# Patient Record
Sex: MALE | Race: WHITE | Employment: OTHER | URBAN - METROPOLITAN AREA
[De-identification: names, ages, dates, MRNs, and addresses within clinical notes are randomized per-mention and may not be internally consistent; named-entity substitution may affect disease eponyms.]

---

## 2023-02-15 ENCOUNTER — OFFICE VISIT (OUTPATIENT)
Dept: ORTHOPEDIC SURGERY | Age: 83
End: 2023-02-15

## 2023-02-15 VITALS — BODY MASS INDEX: 29.47 KG/M2 | WEIGHT: 199 LBS | HEIGHT: 69 IN

## 2023-02-15 DIAGNOSIS — M25.561 ACUTE PAIN OF BOTH KNEES: Primary | ICD-10-CM

## 2023-02-15 DIAGNOSIS — M17.11 PRIMARY OSTEOARTHRITIS OF RIGHT KNEE: ICD-10-CM

## 2023-02-15 DIAGNOSIS — M17.12 PRIMARY OSTEOARTHRITIS OF LEFT KNEE: ICD-10-CM

## 2023-02-15 DIAGNOSIS — M25.562 ACUTE PAIN OF BOTH KNEES: Primary | ICD-10-CM

## 2023-02-15 NOTE — PROGRESS NOTES
Name: Donna Alexander  YOB: 1940  Gender: male  MRN: [id number]    CC: Bilateral knee pain right worse than left    HPI: Donna Alexander is a 79 y.o. male who presents bilateral knee pain right worse than left. He does have a history with his right knee having had arthroscopic surgery x2 with an initial procedure for debridement and removal of loose bodies but a second procedure to remove a third loose body. This was done in [place] and the patient unfortunately not see much benefit following these procedures. Since then over the past couple of years he seen progressive stiffness in his right knee and difficulty in getting back to his desired activities of playing golf and ambulating on uneven ground. His left knee has been giving him more difficulty recently and he wonders if it is related to his right knee limitations. He did see Dr. Irasema Garcia recently and was evaluated and found to have significant degenerative joint disease of his right knee worse in the left and his continued and longstanding symptomatology radiographic and clinical findings it would suggest that he seek an opinion for joint replacement surgery. History was obtained from patient    ROS/Meds/PSH/PMH/FH/SH: I personally reviewed the patients standard intake form. Below are the pertinents    Tobacco:  has no history on file for tobacco use. No past medical history on file. No past surgical history on file. Physical Examination:  Physical exam: On examination of the patient's gait there is there is valgus deformity in the rightknee and there is a flexion contracture in the rightknee    On examination while standing there is decreased flexion in the lumbosacral spine without acute list or spasm. While seated ,  the Bilateral hip is examined there is full range of motion without obvious issue .      On examination of the  Right knee :ROM is 7 to 125 Knee is in valgus which corrects with varus stress to some degree and There is a mild effusion. On examination of the  Left knee :ROM is 0 to 125 There is no obvious effusion. Patient is neurologically intact distally. Skin is intact. Imaging:   Radiographs: An AP standing, skiers, flexion lateral, and sunrise view of the right knee knee was obtained  This demonstrated  Marked lateral joint space narrowing and Marked osteophyte formation in all 3 compartments. Radiographic impression: severe DJD right Knee    Radiographs: An AP standing, skiers, flexion lateral, and sunrise view of the left knee was obtained  This demonstrated  Moderate joint space narrowing of the lateral compartment. 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 He does have moderate DJD of his left knee but his right knee is worse. He has had longstanding symptoms and arthroscopy x2 without significant benefit. I did discuss with the patient the source of the pain and treatment options. We discussed nonsurgical measures including:Injection therapy, Physical Therapy, Activity Modification, and Use of assistive device. We also discussed the definitive option of total Knee arthroplasty. I counseled the patient regarding the nature of the procedure the preoperative preparation necessary postop recovery and expected outcome. I counseled them regarding the risks of surgery including risk of infection, DVT formation, loss of motion, dislocation and stiffness. This patient has the usual expected risk for perioperative medical or surgical complication. We discussed time return to work , general activity and long-term expectations. I described the [address] program for the patient and expected time for hospitalization. This patient has good family support and meets criteria for consideration for the [facility] outpatient program.    I would plan a Cemented Mobile Bearing Depuy Attune TKA.  Jossie Bocanegra Robot-Assisted. I discussed Robot-assisted surgery with the patient I discussed my implant selection process and rationale with the patient. Patient would like to consider options, if they would like to proceed with surgery they will let us know. If so, it will be a category 1 in technical complexity. This reflects my expectation for surgical time and difficulty but does not indicate the overall complexity of the patient's care. In regards to his left knee he does have moderate symptomatology and radiographic findings today. I would not at this time recommend consideration for elective knee replacement surgery on the left side. I do think he is a good candidate for consideration of viscosupplementation the left and we did discuss expectations there he was given a written brochure regarding the procedure. We will go ahead and set him up for course of viscosupplementation on the left and proceed with knee replacement as outlined on the right at his convenience. Plan:       Follow up:   Return for Surgery, Set Up [facility] 3 Visits.      Bryson Fitzpatrick MD

## 2023-03-07 DIAGNOSIS — M17.12 PRIMARY OSTEOARTHRITIS OF LEFT KNEE: Primary | ICD-10-CM

## 2023-03-08 ENCOUNTER — OFFICE VISIT (OUTPATIENT)
Dept: ORTHOPEDIC SURGERY | Age: 83
End: 2023-03-08

## 2023-03-08 DIAGNOSIS — M17.12 PRIMARY OSTEOARTHRITIS OF LEFT KNEE: Primary | ICD-10-CM

## 2023-03-08 DIAGNOSIS — M17.11 PRIMARY OSTEOARTHRITIS OF RIGHT KNEE: ICD-10-CM

## 2023-03-08 RX ORDER — HYALURONATE SODIUM 10 MG/ML
20 SYRINGE (ML) INTRAARTICULAR ONCE
Status: COMPLETED | OUTPATIENT
Start: 2023-03-08 | End: 2023-03-08

## 2023-03-08 RX ADMIN — Medication 20 MG: at 13:41

## 2023-03-08 RX ADMIN — Medication 20 MG: at 13:59

## 2023-03-08 NOTE — PROGRESS NOTES
Name: Donna Alexander  YOB: 1940  Gender: male  MRN: [id number]     CC: BILATERAL Knee Osteoarthritis      PROCEDURE: #1 of 3 Hyaluronic Injection    After discussion of risks and benefits including but not limited to pain, infection, skin discoloration, and injury to blood vessels or nerves the patient verbally consented to proceed with injection of the BILATERAL. The patient is to restrict their activity for 48 hours. Radiology Report: US guidance was used to examine the joint, ensure adequate needle placement and to decrease the risk of joint aggravation. The intracondylar notch, retropatellar fat pad, patella tendon, patella and tibia were all visualized. Pre and post injection US still images were obtained and placed in the record. Image were obtained with a [employer] ultrasound transducer (model [id number]). Procedure Note: After steriley prepping the BILATERAL knee, it was injected with a 2mL of Euflexxa and the medication was observed going into the intra-articular space via US guidance. The patient tolerated the procedure without difficulty.     CRISTINA Maria

## 2023-03-15 ENCOUNTER — OFFICE VISIT (OUTPATIENT)
Dept: ORTHOPEDIC SURGERY | Age: 83
End: 2023-03-15
Payer: MEDICARE

## 2023-03-15 DIAGNOSIS — M17.12 PRIMARY OSTEOARTHRITIS OF LEFT KNEE: Primary | ICD-10-CM

## 2023-03-15 DIAGNOSIS — M17.11 PRIMARY OSTEOARTHRITIS OF RIGHT KNEE: ICD-10-CM

## 2023-03-15 PROCEDURE — 20611 DRAIN/INJ JOINT/BURSA W/US: CPT | Performed by: PHYSICIAN ASSISTANT

## 2023-03-15 RX ORDER — HYALURONATE SODIUM 10 MG/ML
20 SYRINGE (ML) INTRAARTICULAR ONCE
Status: COMPLETED | OUTPATIENT
Start: 2023-03-15 | End: 2023-03-15

## 2023-03-15 RX ADMIN — Medication 20 MG: at 13:25

## 2023-03-15 NOTE — PROGRESS NOTES
Name: Simona Lopez  YOB: 1940  Gender: male  MRN: [id number]     CC: BILATERAL Knee Osteoarthritis      PROCEDURE: #2 of 3 Hyaluronic Injection    After discussion of risks and benefits including but not limited to pain, infection, skin discoloration, and injury to blood vessels or nerves the patient verbally consented to proceed with injection of the BILATERAL. The patient is to restrict their activity for 48 hours. Radiology Report: US guidance was used to examine the joint, ensure adequate needle placement and to decrease the risk of joint aggravation. The intracondylar notch, retropatellar fat pad, patella tendon, patella and tibia were all visualized. Pre and post injection US still images were obtained and placed in the record. Image were obtained with a [employer] ultrasound transducer (model [id number]). Procedure Note: After steriley prepping the BILATERAL knee, it was injected with a 2mL of Euflexxa and the medication was observed going into the intra-articular space via US guidance. The patient tolerated the procedure without difficulty.     CRISTINA Mcdaniel

## 2023-03-22 ENCOUNTER — OFFICE VISIT (OUTPATIENT)
Dept: ORTHOPEDIC SURGERY | Age: 83
End: 2023-03-22

## 2023-03-22 DIAGNOSIS — M17.12 PRIMARY OSTEOARTHRITIS OF LEFT KNEE: Primary | ICD-10-CM

## 2023-03-22 DIAGNOSIS — M17.11 PRIMARY OSTEOARTHRITIS OF RIGHT KNEE: ICD-10-CM

## 2023-03-22 RX ORDER — HYALURONATE SODIUM 10 MG/ML
20 SYRINGE (ML) INTRAARTICULAR ONCE
Status: COMPLETED | OUTPATIENT
Start: 2023-03-22 | End: 2023-03-22

## 2023-03-22 RX ADMIN — Medication 20 MG: at 14:10

## 2023-03-22 NOTE — PROGRESS NOTES
Name: Tiera Cole  YOB: 1940  Gender: male  MRN: [id number]     CC: BILATERAL Knee Osteoarthritis      PROCEDURE: #3 of 3 Hyaluronic Injection    After discussion of risks and benefits including but not limited to pain, infection, skin discoloration, and injury to blood vessels or nerves the patient verbally consented to proceed with injection of the BILATERAL. The patient is to restrict their activity for 48 hours. Radiology Report: US guidance was used to examine the joint, ensure adequate needle placement and to decrease the risk of joint aggravation. The intracondylar notch, retropatellar fat pad, patella tendon, patella and tibia were all visualized. Pre and post injection US still images were obtained and placed in the record. Image were obtained with a [employer] ultrasound transducer (model [id number]). Procedure Note: After steriley prepping the BILATERAL knee, it was injected with a 2mL of Euflexxa and the medication was observed going into the intra-articular space via US guidance. The patient tolerated the procedure without difficulty.     CRISTINA Padilla

## 2023-05-04 ENCOUNTER — TELEPHONE (OUTPATIENT)
Dept: ORTHOPEDIC SURGERY | Age: 83
End: 2023-05-04

## 2023-05-23 DIAGNOSIS — M17.11 PRIMARY OSTEOARTHRITIS OF RIGHT KNEE: Primary | ICD-10-CM

## 2023-07-07 ENCOUNTER — PREP FOR PROCEDURE (OUTPATIENT)
Dept: ORTHOPEDIC SURGERY | Age: 83
End: 2023-07-07

## 2023-07-07 DIAGNOSIS — M17.11 PRIMARY OSTEOARTHRITIS OF RIGHT KNEE: Primary | ICD-10-CM

## 2023-07-07 RX ORDER — ACETAMINOPHEN 325 MG/1
1000 TABLET ORAL ONCE
Status: CANCELLED | OUTPATIENT
Start: 2023-07-07 | End: 2023-07-07

## 2023-07-10 ENCOUNTER — TELEPHONE (OUTPATIENT)
Dept: ORTHOPEDIC SURGERY | Age: 83
End: 2023-07-10

## 2023-07-10 NOTE — TELEPHONE ENCOUNTER
Spoke with patient. He is possibly having to have back surgery so he will call back at a later date and reschedule.

## 2023-08-24 ENCOUNTER — TELEPHONE (OUTPATIENT)
Dept: ORTHOPEDIC SURGERY | Age: 83
End: 2023-08-24

## 2023-09-05 DIAGNOSIS — M17.11 PRIMARY OSTEOARTHRITIS OF RIGHT KNEE: Primary | ICD-10-CM

## 2023-09-27 ENCOUNTER — OFFICE VISIT (OUTPATIENT)
Dept: ORTHOPEDIC SURGERY | Age: 83
End: 2023-09-27
Payer: MEDICARE

## 2023-09-27 DIAGNOSIS — M16.11 OSTEOARTHRITIS OF RIGHT HIP, UNSPECIFIED OSTEOARTHRITIS TYPE: ICD-10-CM

## 2023-09-27 DIAGNOSIS — M54.50 CHRONIC BILATERAL LOW BACK PAIN, UNSPECIFIED WHETHER SCIATICA PRESENT: ICD-10-CM

## 2023-09-27 DIAGNOSIS — M16.12 UNILATERAL PRIMARY OSTEOARTHRITIS, LEFT HIP: ICD-10-CM

## 2023-09-27 DIAGNOSIS — G89.29 CHRONIC BILATERAL LOW BACK PAIN, UNSPECIFIED WHETHER SCIATICA PRESENT: ICD-10-CM

## 2023-09-27 DIAGNOSIS — M17.11 PRIMARY OSTEOARTHRITIS OF RIGHT KNEE: ICD-10-CM

## 2023-09-27 DIAGNOSIS — M25.552 LEFT HIP PAIN: Primary | ICD-10-CM

## 2023-09-27 PROCEDURE — 99214 OFFICE O/P EST MOD 30 MIN: CPT | Performed by: ORTHOPAEDIC SURGERY

## 2023-09-27 PROCEDURE — 4004F PT TOBACCO SCREEN RCVD TLK: CPT | Performed by: ORTHOPAEDIC SURGERY

## 2023-09-27 PROCEDURE — G8417 CALC BMI ABV UP PARAM F/U: HCPCS | Performed by: ORTHOPAEDIC SURGERY

## 2023-09-27 PROCEDURE — G8428 CUR MEDS NOT DOCUMENT: HCPCS | Performed by: ORTHOPAEDIC SURGERY

## 2023-09-27 PROCEDURE — 1123F ACP DISCUSS/DSCN MKR DOCD: CPT | Performed by: ORTHOPAEDIC SURGERY

## 2023-09-27 NOTE — PROGRESS NOTES
examination of the  Right knee :ROM is 10 to 120 Knee is in valgus which corrects with varus stress to some degree. On examination of the  Left knee :ROM is 0 to 125 There is no obvious effusion. Patient is neurologically intact distally. Skin is intact. Imaging:   Radiographs: An AP pelvis and table down lateral of the left hip was obtained  This demonstrated There is moderate joint space narrowing . 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 His back seems to be functioning better and I believe his left hip is exacerbated by initiating increased activity with physical therapy. I advised him to discontinue formal therapy for now and to resume his regular activities. We talked about the use an assistive device. Things have improved quite a bit since he discontinued therapy on his own and I think he would likely continue to improve. His right knee remains his biggest and most longstanding issue. He is scheduled for total knee arthroplasty in the right I think going to proceed with that. I reiterated with him that he has multiple levels of arthritic issues including his lower back with his history of stenosis and sciatica, mild DJD of the right hip as well as his valgus DJD of the right knee. He understands these issues again a likely plan on each other. Hopefully as he recovers from his knee replacement surgery therapy will become more beneficial to him and he can resume a more normal gait and activity. He can continue with oral anti-inflammatories as he has been prescribed. Plan:       Follow up:   Return for As currently scheduled.      Valarie Shukla MD

## 2023-10-10 ENCOUNTER — PREP FOR PROCEDURE (OUTPATIENT)
Dept: ORTHOPEDIC SURGERY | Age: 83
End: 2023-10-10

## 2023-10-10 DIAGNOSIS — M17.11 PRIMARY OSTEOARTHRITIS OF RIGHT KNEE: Primary | ICD-10-CM

## 2023-10-10 RX ORDER — ACETAMINOPHEN 500 MG
1000 TABLET ORAL ONCE
Status: CANCELLED | OUTPATIENT
Start: 2023-10-10 | End: 2023-10-10

## 2023-10-19 ENCOUNTER — TELEPHONE (OUTPATIENT)
Dept: ORTHOPEDIC SURGERY | Age: 83
End: 2023-10-19

## 2023-10-19 NOTE — TELEPHONE ENCOUNTER
Spoke with patient. Wants to cancel surgery for now due to having issues with his back that will require back surgery.